# Patient Record
(demographics unavailable — no encounter records)

---

## 2024-10-22 NOTE — HISTORY OF PRESENT ILLNESS
[Home] : at home, [unfilled] , at the time of the visit. [Medical Office: ([facility])___] : at the medical office located in  [Verbal consent obtained from patient] : the patient, [unfilled] [FreeTextEntry1] : asking about med refills now down to smoking 6 cig/day drinks 6 packs on the weekends states does not check bp nor own a bp cuff

## 2024-10-22 NOTE — PLAN
[FreeTextEntry1] : reduce to 2 beers qd on wknds smoking cessation encouraged consider nicotine replacement therapies cont rx close bp monitoring at home rtc on 10/27 to f/u w/ Dr. Diane for bw and bp chk